# Patient Record
Sex: FEMALE | URBAN - METROPOLITAN AREA
[De-identification: names, ages, dates, MRNs, and addresses within clinical notes are randomized per-mention and may not be internally consistent; named-entity substitution may affect disease eponyms.]

---

## 2023-02-28 ENCOUNTER — OFFICE VISIT (OUTPATIENT)
Dept: URBAN - METROPOLITAN AREA CLINIC 40 | Facility: CLINIC | Age: 73
End: 2023-02-28
Payer: MEDICARE

## 2023-02-28 ENCOUNTER — LAB OUTSIDE AN ENCOUNTER (OUTPATIENT)
Dept: URBAN - METROPOLITAN AREA CLINIC 40 | Facility: CLINIC | Age: 73
End: 2023-02-28

## 2023-02-28 ENCOUNTER — WEB ENCOUNTER (OUTPATIENT)
Dept: URBAN - METROPOLITAN AREA CLINIC 40 | Facility: CLINIC | Age: 73
End: 2023-02-28

## 2023-02-28 VITALS
DIASTOLIC BLOOD PRESSURE: 80 MMHG | BODY MASS INDEX: 36.11 KG/M2 | WEIGHT: 203.8 LBS | TEMPERATURE: 97.2 F | HEIGHT: 63 IN | SYSTOLIC BLOOD PRESSURE: 130 MMHG

## 2023-02-28 DIAGNOSIS — R19.5 POSITIVE COLORECTAL CANCER SCREENING USING COLOGUARD TEST: ICD-10-CM

## 2023-02-28 DIAGNOSIS — Z85.3 HISTORY OF BREAST CANCER: ICD-10-CM

## 2023-02-28 DIAGNOSIS — R93.5 ABNORMAL COMPUTED TOMOGRAPHY ANGIOGRAPHY (CTA) OF ABDOMEN: ICD-10-CM

## 2023-02-28 DIAGNOSIS — K92.1 HEMATOCHEZIA: ICD-10-CM

## 2023-02-28 DIAGNOSIS — R11.2 NAUSEA AND VOMITING, UNSPECIFIED VOMITING TYPE: ICD-10-CM

## 2023-02-28 DIAGNOSIS — K52.9 COLITIS: ICD-10-CM

## 2023-02-28 PROBLEM — 429087003: Status: ACTIVE | Noted: 2023-02-28

## 2023-02-28 PROCEDURE — 99204 OFFICE O/P NEW MOD 45 MIN: CPT | Performed by: INTERNAL MEDICINE

## 2023-02-28 RX ORDER — METRONIDAZOLE 500 MG/1
1 TABLET TABLET ORAL THREE TIMES A DAY
Status: ACTIVE | COMMUNITY

## 2023-02-28 RX ORDER — METOPROLOL TARTRATE 50 MG/1
1 TABLET WITH FOOD TABLET, FILM COATED ORAL TWICE A DAY
Status: ACTIVE | COMMUNITY

## 2023-02-28 RX ORDER — PRAVASTATIN SODIUM 20 MG/1
1 TABLET TABLET ORAL ONCE A DAY
Status: ACTIVE | COMMUNITY

## 2023-02-28 RX ORDER — LISINOPRIL AND HYDROCHLOROTHIAZIDE 10; 12.5 MG/1; MG/1
1 TABLET TABLET ORAL ONCE A DAY
Status: ACTIVE | COMMUNITY

## 2023-02-28 RX ORDER — LEVOTHYROXINE SODIUM 125 UG/1
1 TABLET IN THE MORNING ON AN EMPTY STOMACH TABLET ORAL ONCE A DAY
Status: ACTIVE | COMMUNITY

## 2023-02-28 NOTE — HPI-TODAY'S VISIT:
Patient was evaluated in the emergency room on 2/22/2023 for abdominal pain nausea vomiting and hematochezia.  Her CBC and CMP were essentially normal her CT angiogram of the abdomen and pelvis did show mucosal edema and abnormal wall thickening involving the distal descending colon and sigmoid colon to the rectum consistent with acute colitis, either infectious ischemic or inflammatory there was also a small hiatal hernia with distal esophageal wall thickening.  On 1/31/2023 she did have a Cologuard test which was positive. Patient presents for evaluation on 2/28/2023.  She states that she underwent her last colonoscopy about 12 years ago, and she was told that it was normal except for a somewhat redundant colon.  She has had a history of chronic constipation, and has been on MiraLAX therapy.  She has no reflux or swallowing issues she was doing well until about 1 week ago when about 2 hours after eating a chicken salad sandwich she experienced acute nausea vomiting and diarrhea.  Shortly thereafter she experienced rectal bleeding, and was evaluated in the emergency room .  Her CBC and CMP were essentially normal, and the CT angiogram of the abdomen pelvis did show mucosal edema and abnormal wall thickening involving the distal descending colon sigmoid colon to the rectum, consistent with acute colitis, either infectious ischemic or inflammatory.  There was also a small hiatal hernia with distal esophageal wall thickening.  Of note on 1/31/2023 she did have a positive Cologuard test.  She is clinically much improved at present, and completing her course of metronidazole therapy.  She only took 2 days of Cipro, because of her history of neuropathy.  She does have a past history of breast cancer.

## 2023-03-03 ENCOUNTER — TELEPHONE ENCOUNTER (OUTPATIENT)
Dept: URBAN - METROPOLITAN AREA CLINIC 40 | Facility: CLINIC | Age: 73
End: 2023-03-03

## 2023-03-23 ENCOUNTER — WEB ENCOUNTER (OUTPATIENT)
Dept: URBAN - METROPOLITAN AREA SURGERY CENTER 30 | Facility: SURGERY CENTER | Age: 73
End: 2023-03-23

## 2023-03-27 ENCOUNTER — CLAIMS CREATED FROM THE CLAIM WINDOW (OUTPATIENT)
Dept: URBAN - METROPOLITAN AREA SURGERY CENTER 30 | Facility: SURGERY CENTER | Age: 73
End: 2023-03-27
Payer: MEDICARE

## 2023-03-27 ENCOUNTER — CLAIMS CREATED FROM THE CLAIM WINDOW (OUTPATIENT)
Dept: URBAN - METROPOLITAN AREA SURGERY CENTER 30 | Facility: SURGERY CENTER | Age: 73
End: 2023-03-27

## 2023-03-27 ENCOUNTER — CLAIMS CREATED FROM THE CLAIM WINDOW (OUTPATIENT)
Dept: URBAN - METROPOLITAN AREA CLINIC 4 | Facility: CLINIC | Age: 73
End: 2023-03-27
Payer: MEDICARE

## 2023-03-27 DIAGNOSIS — K29.80 ACUTE DUODENITIS: ICD-10-CM

## 2023-03-27 DIAGNOSIS — K31.89 ACQUIRED DEFORMITY OF DUODENUM: ICD-10-CM

## 2023-03-27 DIAGNOSIS — R93.5 ABNORMAL COMPUTED TOMOGRAPHY ANGIOGRAPHY (CTA) OF ABDOMEN: ICD-10-CM

## 2023-03-27 DIAGNOSIS — K21.9 ACID REFLUX: ICD-10-CM

## 2023-03-27 DIAGNOSIS — K52.9 COLITIS: ICD-10-CM

## 2023-03-27 DIAGNOSIS — R93.3 ABN FINDINGS-GI TRACT: ICD-10-CM

## 2023-03-27 DIAGNOSIS — Z85.3 HISTORY OF BREAST CANCER: ICD-10-CM

## 2023-03-27 DIAGNOSIS — R19.5 POSITIVE COLORECTAL CANCER SCREENING USING COLOGUARD TEST: ICD-10-CM

## 2023-03-27 DIAGNOSIS — D12.3 ADENOMA OF TRANSVERSE COLON: ICD-10-CM

## 2023-03-27 DIAGNOSIS — K29.81 DUODENITIS WITH BLEEDING: ICD-10-CM

## 2023-03-27 DIAGNOSIS — R19.5 ABNORMAL CONSISTENCY OF STOOL: ICD-10-CM

## 2023-03-27 DIAGNOSIS — K92.1 HEMATOCHEZIA: ICD-10-CM

## 2023-03-27 DIAGNOSIS — R11.2 NAUSEA AND VOMITING, UNSPECIFIED VOMITING TYPE: ICD-10-CM

## 2023-03-27 DIAGNOSIS — K29.70 GASTRITIS, UNSPECIFIED, WITHOUT BLEEDING: ICD-10-CM

## 2023-03-27 PROCEDURE — 45385 COLONOSCOPY W/LESION REMOVAL: CPT | Performed by: INTERNAL MEDICINE

## 2023-03-27 PROCEDURE — 88305 TISSUE EXAM BY PATHOLOGIST: CPT | Performed by: PATHOLOGY

## 2023-03-27 PROCEDURE — 43239 EGD BIOPSY SINGLE/MULTIPLE: CPT | Performed by: INTERNAL MEDICINE

## 2023-03-27 PROCEDURE — G8907 PT DOC NO EVENTS ON DISCHARG: HCPCS | Performed by: INTERNAL MEDICINE

## 2023-03-27 PROCEDURE — 88312 SPECIAL STAINS GROUP 1: CPT | Performed by: PATHOLOGY

## 2023-03-27 RX ORDER — LEVOTHYROXINE SODIUM 125 UG/1
1 TABLET IN THE MORNING ON AN EMPTY STOMACH TABLET ORAL ONCE A DAY
Status: ACTIVE | COMMUNITY

## 2023-03-27 RX ORDER — LISINOPRIL AND HYDROCHLOROTHIAZIDE 10; 12.5 MG/1; MG/1
1 TABLET TABLET ORAL ONCE A DAY
Status: ACTIVE | COMMUNITY

## 2023-03-27 RX ORDER — METRONIDAZOLE 500 MG/1
1 TABLET TABLET ORAL THREE TIMES A DAY
Status: ACTIVE | COMMUNITY

## 2023-03-27 RX ORDER — METOPROLOL TARTRATE 50 MG/1
1 TABLET WITH FOOD TABLET, FILM COATED ORAL TWICE A DAY
Status: ACTIVE | COMMUNITY

## 2023-03-27 RX ORDER — PRAVASTATIN SODIUM 20 MG/1
1 TABLET TABLET ORAL ONCE A DAY
Status: ACTIVE | COMMUNITY

## 2023-04-25 ENCOUNTER — OFFICE VISIT (OUTPATIENT)
Dept: URBAN - METROPOLITAN AREA CLINIC 40 | Facility: CLINIC | Age: 73
End: 2023-04-25
Payer: MEDICARE

## 2023-04-25 VITALS
HEART RATE: 97 BPM | DIASTOLIC BLOOD PRESSURE: 84 MMHG | HEIGHT: 63 IN | WEIGHT: 197.2 LBS | SYSTOLIC BLOOD PRESSURE: 126 MMHG | BODY MASS INDEX: 34.94 KG/M2 | TEMPERATURE: 96.6 F

## 2023-04-25 DIAGNOSIS — A04.72 C. DIFFICILE COLITIS: ICD-10-CM

## 2023-04-25 DIAGNOSIS — D12.6 TUBULAR ADENOMA OF COLON: ICD-10-CM

## 2023-04-25 DIAGNOSIS — K92.1 HEMATOCHEZIA: ICD-10-CM

## 2023-04-25 DIAGNOSIS — R11.2 NAUSEA AND VOMITING, UNSPECIFIED VOMITING TYPE: ICD-10-CM

## 2023-04-25 DIAGNOSIS — R19.5 POSITIVE COLORECTAL CANCER SCREENING USING COLOGUARD TEST: ICD-10-CM

## 2023-04-25 PROCEDURE — 99214 OFFICE O/P EST MOD 30 MIN: CPT | Performed by: INTERNAL MEDICINE

## 2023-04-25 RX ORDER — LISINOPRIL AND HYDROCHLOROTHIAZIDE 10; 12.5 MG/1; MG/1
1 TABLET TABLET ORAL ONCE A DAY
Status: ACTIVE | COMMUNITY

## 2023-04-25 RX ORDER — LEVOTHYROXINE SODIUM 125 UG/1
1 TABLET IN THE MORNING ON AN EMPTY STOMACH TABLET ORAL ONCE A DAY
Status: ACTIVE | COMMUNITY

## 2023-04-25 RX ORDER — METRONIDAZOLE 500 MG/1
1 TABLET TABLET ORAL THREE TIMES A DAY
Status: ON HOLD | COMMUNITY

## 2023-04-25 RX ORDER — PRAVASTATIN SODIUM 20 MG/1
1 TABLET TABLET ORAL ONCE A DAY
Status: ACTIVE | COMMUNITY

## 2023-04-25 RX ORDER — METOPROLOL TARTRATE 50 MG/1
1 TABLET WITH FOOD TABLET, FILM COATED ORAL TWICE A DAY
Status: ACTIVE | COMMUNITY

## 2023-04-25 NOTE — HPI-TODAY'S VISIT:
Patient was evaluated in the emergency room on 2/22/2023 for abdominal pain nausea vomiting and hematochezia.  Her CBC and CMP were essentially normal her CT angiogram of the abdomen and pelvis did show mucosal edema and abnormal wall thickening involving the distal descending colon and sigmoid colon to the rectum consistent with acute colitis, either infectious ischemic or inflammatory there was also a small hiatal hernia with distal esophageal wall thickening.  On 1/31/2023 she did have a Cologuard test which was positive. Patient presents for evaluation on 2/28/2023.  She states that she underwent her last colonoscopy about 12 years ago, and she was told that it was normal except for a somewhat redundant colon.  She has had a history of chronic constipation, and has been on MiraLAX therapy.  She has no reflux or swallowing issues she was doing well until about 1 week ago when about 2 hours after eating a chicken salad sandwich she experienced acute nausea vomiting and diarrhea.  Shortly thereafter she experienced rectal bleeding, and was evaluated in the emergency room .  Her CBC and CMP were essentially normal, and the CT angiogram of the abdomen pelvis did show mucosal edema and abnormal wall thickening involving the distal descending colon sigmoid colon to the rectum, consistent with acute colitis, either infectious ischemic or inflammatory.  There was also a small hiatal hernia with distal esophageal wall thickening.  Of note on 1/31/2023 she did have a positive Cologuard test.  She is clinically much improved at present, and completing her course of metronidazole therapy.  She only took 2 days of Cipro, because of her history of neuropathy.  She does have a past history of breast cancer. Patient underwent EGD and colonoscopy on 3/27/2023 the endoscopy showed esophagitis with mild gastritis and duodenitis biopsies were taken biopsies from the esophagus stomach and duodenum revealed benign inflammation.  The colonoscopy revealed a 3 mm polyp that was removed from the transverse colon with pathology showing tubular adenoma. Patient was admitted to [facility] on 3/29/2023 after presenting with nausea vomiting diarrhea she was hypokalemic WBCs was 18.2 she underwent abdominal pelvic CT scan which showed diffuse colonic wall thickening suspicious for colitis stool studies did reveal a positive C. difficile infection.  Patient responded to treatment, and was given a 2-week course of p.o. vancomycin, to be completed as an outpatient. Patient returns for follow-up on 4/25/2023.  She did complete 2 weeks of vancomycin therapy 125 mg 4 times daily as prescribed at discharge from the hospital.  She has been taking daily probiotics.  She is much improved from a GI standpoint.  She is having no further abdominal pain, and she is averaging 2-3 soft stools per day, with no bleeding.  I reviewed in detail the results of her EGD and colonoscopy done 3/27/2023 the upper endoscopy showed mild esophagitis gastritis and duodenitis, with biopsy showing benign inflammation.  The colonoscopy revealed a normal-appearing mucosa, and a small tubular adenoma was removed from the transverse colon.  She was subsequently admitted to [facility] with nausea vomiting diarrhea, she was treated for hypokalemia, abdominal pelvic CT scan showed diffuse colitis and stool studies did show a positive C. difficile infection.  At this point she is clinically much improved.

## 2023-07-12 ENCOUNTER — DASHBOARD ENCOUNTERS (OUTPATIENT)
Age: 73
End: 2023-07-12

## 2023-07-25 ENCOUNTER — OFFICE VISIT (OUTPATIENT)
Dept: URBAN - METROPOLITAN AREA CLINIC 40 | Facility: CLINIC | Age: 73
End: 2023-07-25

## 2023-07-25 RX ORDER — LEVOTHYROXINE SODIUM 125 UG/1
1 TABLET IN THE MORNING ON AN EMPTY STOMACH TABLET ORAL ONCE A DAY
Status: ACTIVE | COMMUNITY

## 2023-07-25 RX ORDER — METOPROLOL TARTRATE 50 MG/1
1 TABLET WITH FOOD TABLET, FILM COATED ORAL TWICE A DAY
Status: ACTIVE | COMMUNITY

## 2023-07-25 RX ORDER — METRONIDAZOLE 500 MG/1
1 TABLET TABLET ORAL THREE TIMES A DAY
COMMUNITY

## 2023-07-25 RX ORDER — PRAVASTATIN SODIUM 20 MG/1
1 TABLET TABLET ORAL ONCE A DAY
Status: ACTIVE | COMMUNITY

## 2023-07-25 RX ORDER — LISINOPRIL AND HYDROCHLOROTHIAZIDE 10; 12.5 MG/1; MG/1
1 TABLET TABLET ORAL ONCE A DAY
Status: ACTIVE | COMMUNITY

## 2024-08-03 PROBLEM — 428283002: Status: ACTIVE | Noted: 2024-08-03

## 2024-08-03 PROBLEM — 14760008: Status: ACTIVE | Noted: 2024-08-03

## 2024-08-06 ENCOUNTER — OFFICE VISIT (OUTPATIENT)
Dept: URBAN - METROPOLITAN AREA CLINIC 40 | Facility: CLINIC | Age: 74
End: 2024-08-06
Payer: MEDICARE

## 2024-08-06 VITALS
HEIGHT: 63 IN | HEART RATE: 63 BPM | SYSTOLIC BLOOD PRESSURE: 162 MMHG | DIASTOLIC BLOOD PRESSURE: 92 MMHG | WEIGHT: 192.4 LBS | BODY MASS INDEX: 34.09 KG/M2 | TEMPERATURE: 97.7 F

## 2024-08-06 DIAGNOSIS — K52.89 (LYMPHOCYTIC) MICROSCOPIC COLITIS: ICD-10-CM

## 2024-08-06 DIAGNOSIS — K59.09 CHANGE IN BOWEL MOVEMENTS INTERMITTENT CONSTIPATION. URGENCY IN THE MORNING.: ICD-10-CM

## 2024-08-06 PROCEDURE — 99214 OFFICE O/P EST MOD 30 MIN: CPT | Performed by: INTERNAL MEDICINE

## 2024-08-06 RX ORDER — METRONIDAZOLE 500 MG/1
1 TABLET TABLET ORAL THREE TIMES A DAY
COMMUNITY

## 2024-08-06 RX ORDER — LEVOTHYROXINE SODIUM 125 UG/1
1 TABLET IN THE MORNING ON AN EMPTY STOMACH TABLET ORAL ONCE A DAY
Status: ACTIVE | COMMUNITY

## 2024-08-06 RX ORDER — LINACLOTIDE 145 UG/1
1 CAPSULE AT LEAST 30 MINUTES BEFORE THE FIRST MEAL OF THE DAY ON AN EMPTY STOMACH CAPSULE, GELATIN COATED ORAL ONCE A DAY
Qty: 90 | Refills: 3 | OUTPATIENT
Start: 2024-08-06 | End: 2025-08-01

## 2024-08-06 RX ORDER — METOPROLOL TARTRATE 50 MG/1
1 TABLET WITH FOOD TABLET, FILM COATED ORAL TWICE A DAY
Status: ACTIVE | COMMUNITY

## 2024-08-06 RX ORDER — PRAVASTATIN SODIUM 20 MG/1
1 TABLET TABLET ORAL ONCE A DAY
Status: ACTIVE | COMMUNITY

## 2024-08-06 RX ORDER — LISINOPRIL AND HYDROCHLOROTHIAZIDE 10; 12.5 MG/1; MG/1
1 TABLET TABLET ORAL ONCE A DAY
Status: ACTIVE | COMMUNITY

## 2024-08-06 NOTE — HPI-TODAY'S VISIT:
Patient was evaluated in the emergency room on 2/22/2023 for abdominal pain nausea vomiting and hematochezia.  Her CBC and CMP were essentially normal her CT angiogram of the abdomen and pelvis did show mucosal edema and abnormal wall thickening involving the distal descending colon and sigmoid colon to the rectum consistent with acute colitis, either infectious ischemic or inflammatory there was also a small hiatal hernia with distal esophageal wall thickening.  On 1/31/2023 she did have a Cologuard test which was positive. Patient presents for evaluation on 2/28/2023.  She states that she underwent her last colonoscopy about 12 years ago, and she was told that it was normal except for a somewhat redundant colon.  She has had a history of chronic constipation, and has been on MiraLAX therapy.  She has no reflux or swallowing issues she was doing well until about 1 week ago when about 2 hours after eating a chicken salad sandwich she experienced acute nausea vomiting and diarrhea.  Shortly thereafter she experienced rectal bleeding, and was evaluated in the emergency room .  Her CBC and CMP were essentially normal, and the CT angiogram of the abdomen pelvis did show mucosal edema and abnormal wall thickening involving the distal descending colon sigmoid colon to the rectum, consistent with acute colitis, either infectious ischemic or inflammatory.  There was also a small hiatal hernia with distal esophageal wall thickening.  Of note on 1/31/2023 she did have a positive Cologuard test.  She is clinically much improved at present, and completing her course of metronidazole therapy.  She only took 2 days of Cipro, because of her history of neuropathy.  She does have a past history of breast cancer. Patient underwent EGD and colonoscopy on 3/27/2023 the endoscopy showed esophagitis with mild gastritis and duodenitis biopsies were taken biopsies from the esophagus stomach and duodenum revealed benign inflammation.  The colonoscopy revealed a 3 mm polyp that was removed from the transverse colon with pathology showing tubular adenoma. Patient was admitted to [facility] on 3/29/2023 after presenting with nausea vomiting diarrhea she was hypokalemic WBCs was 18.2 she underwent abdominal pelvic CT scan which showed diffuse colonic wall thickening suspicious for colitis stool studies did reveal a positive C. difficile infection.  Patient responded to treatment, and was given a 2-week course of p.o. vancomycin, to be completed as an outpatient. Patient returns for follow-up on 4/25/2023.  She did complete 2 weeks of vancomycin therapy 125 mg 4 times daily as prescribed at discharge from the hospital.  She has been taking daily probiotics.  She is much improved from a GI standpoint.  She is having no further abdominal pain, and she is averaging 2-3 soft stools per day, with no bleeding.  I reviewed in detail the results of her EGD and colonoscopy done 3/27/2023 the upper endoscopy showed mild esophagitis gastritis and duodenitis, with biopsy showing benign inflammation.  The colonoscopy revealed a normal-appearing mucosa, and a small tubular adenoma was removed from the transverse colon.  She was subsequently admitted to [facility] with nausea vomiting diarrhea, she was treated for hypokalemia, abdominal pelvic CT scan showed diffuse colitis and stool studies did show a positive C. difficile infection.  At this point she is clinically much improved. Patient was evaluated in the emergency room on 8/1/2024 for abdominal pain and constipation, not having a significant bowel movement for almost 2 weeks.  She underwent noncontrast abdominal pelvic CT scan which was essentially unremarkable and had essentially normal CBC and CMP. Patient returns for follow-up on 8/6/2024.  Her main problem at present is constipation.  She was evaluated the emergency room 8/1/2024 for abdominal pain and constipation, not having a bowel movement for almost 2 weeks.  She underwent noncontrast abdominal pelvic CT scan which was essentially unremarkable, and an essentially normal CBC and CMP.  Eventually after given laxatives, she did have a bowel movement.  However since returning home on MiraLAX and Dulcolax, she has not had an effective bowel movement in 6 days.  She denies abdominal pain, and has been no overt GI bleeding.  She is having no significant heartburn or reflux, no swallowing issues.  She did undergo EGD and colonoscopy in March 2023.  The upper endoscopy showed esophagitis gastritis and duodenitis, with biopsy showing benign inflammation colonoscopy revealed a 3 mm tubular adenoma that was removed from the transverse colon.

## 2024-08-29 ENCOUNTER — OFFICE VISIT (OUTPATIENT)
Dept: URBAN - METROPOLITAN AREA CLINIC 40 | Facility: CLINIC | Age: 74
End: 2024-08-29

## 2024-09-12 ENCOUNTER — OFFICE VISIT (OUTPATIENT)
Dept: URBAN - METROPOLITAN AREA CLINIC 40 | Facility: CLINIC | Age: 74
End: 2024-09-12

## 2024-10-08 ENCOUNTER — OFFICE VISIT (OUTPATIENT)
Dept: URBAN - METROPOLITAN AREA CLINIC 40 | Facility: CLINIC | Age: 74
End: 2024-10-08
Payer: MEDICARE

## 2024-10-08 VITALS
SYSTOLIC BLOOD PRESSURE: 138 MMHG | DIASTOLIC BLOOD PRESSURE: 76 MMHG | HEIGHT: 63 IN | TEMPERATURE: 97.2 F | BODY MASS INDEX: 33.88 KG/M2 | HEART RATE: 53 BPM | WEIGHT: 191.2 LBS

## 2024-10-08 DIAGNOSIS — Z86.0101 HISTORY OF ADENOMATOUS POLYP OF COLON: ICD-10-CM

## 2024-10-08 DIAGNOSIS — K59.00 CONSTIPATION, UNSPECIFIED CONSTIPATION TYPE: ICD-10-CM

## 2024-10-08 PROCEDURE — 99214 OFFICE O/P EST MOD 30 MIN: CPT | Performed by: INTERNAL MEDICINE

## 2024-10-08 RX ORDER — METRONIDAZOLE 500 MG/1
1 TABLET TABLET ORAL THREE TIMES A DAY
COMMUNITY

## 2024-10-08 RX ORDER — LEVOTHYROXINE SODIUM 125 UG/1
1 TABLET IN THE MORNING ON AN EMPTY STOMACH TABLET ORAL ONCE A DAY
Status: ACTIVE | COMMUNITY

## 2024-10-08 RX ORDER — LISINOPRIL AND HYDROCHLOROTHIAZIDE 10; 12.5 MG/1; MG/1
1 TABLET TABLET ORAL ONCE A DAY
Status: ACTIVE | COMMUNITY

## 2024-10-08 RX ORDER — METOPROLOL TARTRATE 50 MG/1
1 TABLET WITH FOOD TABLET, FILM COATED ORAL TWICE A DAY
Status: ON HOLD | COMMUNITY

## 2024-10-08 RX ORDER — LINACLOTIDE 145 UG/1
1 CAPSULE AT LEAST 30 MINUTES BEFORE THE FIRST MEAL OF THE DAY ON AN EMPTY STOMACH CAPSULE, GELATIN COATED ORAL ONCE A DAY
Qty: 90 | Refills: 3 | OUTPATIENT

## 2024-10-08 RX ORDER — LINACLOTIDE 145 UG/1
1 CAPSULE AT LEAST 30 MINUTES BEFORE THE FIRST MEAL OF THE DAY ON AN EMPTY STOMACH CAPSULE, GELATIN COATED ORAL ONCE A DAY
Qty: 90 | Refills: 3 | Status: ON HOLD | COMMUNITY
Start: 2024-08-06 | End: 2025-08-01

## 2024-10-08 RX ORDER — PRAVASTATIN SODIUM 20 MG/1
1 TABLET TABLET ORAL ONCE A DAY
Status: ACTIVE | COMMUNITY

## 2024-10-08 NOTE — HPI-TODAY'S VISIT:
Patient was evaluated in the emergency room on 2/22/2023 for abdominal pain nausea vomiting and hematochezia.  Her CBC and CMP were essentially normal her CT angiogram of the abdomen and pelvis did show mucosal edema and abnormal wall thickening involving the distal descending colon and sigmoid colon to the rectum consistent with acute colitis, either infectious ischemic or inflammatory there was also a small hiatal hernia with distal esophageal wall thickening.  On 1/31/2023 she did have a Cologuard test which was positive. Patient presents for evaluation on 2/28/2023.  She states that she underwent her last colonoscopy about 12 years ago, and she was told that it was normal except for a somewhat redundant colon.  She has had a history of chronic constipation, and has been on MiraLAX therapy.  She has no reflux or swallowing issues she was doing well until about 1 week ago when about 2 hours after eating a chicken salad sandwich she experienced acute nausea vomiting and diarrhea.  Shortly thereafter she experienced rectal bleeding, and was evaluated in the emergency room .  Her CBC and CMP were essentially normal, and the CT angiogram of the abdomen pelvis did show mucosal edema and abnormal wall thickening involving the distal descending colon sigmoid colon to the rectum, consistent with acute colitis, either infectious ischemic or inflammatory.  There was also a small hiatal hernia with distal esophageal wall thickening.  Of note on 1/31/2023 she did have a positive Cologuard test.  She is clinically much improved at present, and completing her course of metronidazole therapy.  She only took 2 days of Cipro, because of her history of neuropathy.  She does have a past history of breast cancer. Patient underwent EGD and colonoscopy on 3/27/2023 the endoscopy showed esophagitis with mild gastritis and duodenitis biopsies were taken biopsies from the esophagus stomach and duodenum revealed benign inflammation.  The colonoscopy revealed a 3 mm polyp that was removed from the transverse colon with pathology showing tubular adenoma. Patient was admitted to [facility] on 3/29/2023 after presenting with nausea vomiting diarrhea she was hypokalemic WBCs was 18.2 she underwent abdominal pelvic CT scan which showed diffuse colonic wall thickening suspicious for colitis stool studies did reveal a positive C. difficile infection.  Patient responded to treatment, and was given a 2-week course of p.o. vancomycin, to be completed as an outpatient. Patient returns for follow-up on 4/25/2023.  She did complete 2 weeks of vancomycin therapy 125 mg 4 times daily as prescribed at discharge from the hospital.  She has been taking daily probiotics.  She is much improved from a GI standpoint.  She is having no further abdominal pain, and she is averaging 2-3 soft stools per day, with no bleeding.  I reviewed in detail the results of her EGD and colonoscopy done 3/27/2023 the upper endoscopy showed mild esophagitis gastritis and duodenitis, with biopsy showing benign inflammation.  The colonoscopy revealed a normal-appearing mucosa, and a small tubular adenoma was removed from the transverse colon.  She was subsequently admitted to [facility] with nausea vomiting diarrhea, she was treated for hypokalemia, abdominal pelvic CT scan showed diffuse colitis and stool studies did show a positive C. difficile infection.  At this point she is clinically much improved. Patient was evaluated in the emergency room on 8/1/2024 for abdominal pain and constipation, not having a significant bowel movement for almost 2 weeks.  She underwent noncontrast abdominal pelvic CT scan which was essentially unremarkable and had essentially normal CBC and CMP. Patient returns for follow-up on 8/6/2024.  Her main problem at present is constipation.  She was evaluated the emergency room 8/1/2024 for abdominal pain and constipation, not having a bowel movement for almost 2 weeks.  She underwent noncontrast abdominal pelvic CT scan which was essentially unremarkable, and an essentially normal CBC and CMP.  Eventually after given laxatives, she did have a bowel movement.  However since returning home on MiraLAX and Dulcolax, she has not had an effective bowel movement in 6 days.  She denies abdominal pain, and has been no overt GI bleeding.  She is having no significant heartburn or reflux, no swallowing issues.  She did undergo EGD and colonoscopy in March 2023.  The upper endoscopy showed esophagitis gastritis and duodenitis, with biopsy showing benign inflammation colonoscopy revealed a 3 mm tubular adenoma that was removed from the transverse colon. Patient returns for follow-up on 10/8/2024.  She states that she is still experiencing significant constipation, sometimes going as long as 7 days without a bowel movement.  She has been taking Linzess on a daily basis but only 145 mcg this was not effective so she was combining Colace MiraLAX and Dulcolax, without success after a week she did take magnesium citrate and combined with fleets enema which was effective.  I again reviewed her previous ER evaluation for abdominal pain and constipation where she had an abdominal pelvic CT scan essentially unremarkable normal CBC and CMP.  She underwent colonoscopy in March 2023 where a 3 mm tubular adenoma was removed, the exam was otherwise unremarkable.

## 2024-11-12 ENCOUNTER — OFFICE VISIT (OUTPATIENT)
Dept: URBAN - METROPOLITAN AREA CLINIC 40 | Facility: CLINIC | Age: 74
End: 2024-11-12
Payer: MEDICARE

## 2024-11-12 VITALS
TEMPERATURE: 97.7 F | WEIGHT: 198 LBS | DIASTOLIC BLOOD PRESSURE: 90 MMHG | SYSTOLIC BLOOD PRESSURE: 152 MMHG | HEIGHT: 63 IN | BODY MASS INDEX: 35.08 KG/M2 | HEART RATE: 61 BPM

## 2024-11-12 DIAGNOSIS — Z86.0101 HISTORY OF ADENOMATOUS POLYP OF COLON: ICD-10-CM

## 2024-11-12 DIAGNOSIS — K59.00 CONSTIPATION, UNSPECIFIED CONSTIPATION TYPE: ICD-10-CM

## 2024-11-12 PROCEDURE — 99213 OFFICE O/P EST LOW 20 MIN: CPT | Performed by: INTERNAL MEDICINE

## 2024-11-12 RX ORDER — METRONIDAZOLE 500 MG/1
1 TABLET TABLET ORAL THREE TIMES A DAY
COMMUNITY

## 2024-11-12 RX ORDER — METOPROLOL TARTRATE 50 MG/1
1 TABLET WITH FOOD TABLET, FILM COATED ORAL TWICE A DAY
Status: ON HOLD | COMMUNITY

## 2024-11-12 RX ORDER — LEVOTHYROXINE SODIUM 125 UG/1
1 TABLET IN THE MORNING ON AN EMPTY STOMACH TABLET ORAL ONCE A DAY
Status: ACTIVE | COMMUNITY

## 2024-11-12 RX ORDER — LISINOPRIL AND HYDROCHLOROTHIAZIDE 10; 12.5 MG/1; MG/1
1 TABLET TABLET ORAL ONCE A DAY
Status: ACTIVE | COMMUNITY

## 2024-11-12 RX ORDER — LINACLOTIDE 145 UG/1
1 CAPSULE AT LEAST 30 MINUTES BEFORE THE FIRST MEAL OF THE DAY ON AN EMPTY STOMACH CAPSULE, GELATIN COATED ORAL ONCE A DAY
Qty: 90 | Refills: 3 | OUTPATIENT

## 2024-11-12 RX ORDER — PRAVASTATIN SODIUM 20 MG/1
1 TABLET TABLET ORAL ONCE A DAY
Status: ACTIVE | COMMUNITY

## 2024-11-12 RX ORDER — LINACLOTIDE 145 UG/1
1 CAPSULE AT LEAST 30 MINUTES BEFORE THE FIRST MEAL OF THE DAY ON AN EMPTY STOMACH CAPSULE, GELATIN COATED ORAL ONCE A DAY
Qty: 90 | Refills: 3 | Status: ACTIVE | COMMUNITY

## 2024-11-12 NOTE — HPI-TODAY'S VISIT:
Patient was evaluated in the emergency room on 2/22/2023 for abdominal pain nausea vomiting and hematochezia.  Her CBC and CMP were essentially normal her CT angiogram of the abdomen and pelvis did show mucosal edema and abnormal wall thickening involving the distal descending colon and sigmoid colon to the rectum consistent with acute colitis, either infectious ischemic or inflammatory there was also a small hiatal hernia with distal esophageal wall thickening.  On 1/31/2023 she did have a Cologuard test which was positive. Patient presents for evaluation on 2/28/2023.  She states that she underwent her last colonoscopy about 12 years ago, and she was told that it was normal except for a somewhat redundant colon.  She has had a history of chronic constipation, and has been on MiraLAX therapy.  She has no reflux or swallowing issues she was doing well until about 1 week ago when about 2 hours after eating a chicken salad sandwich she experienced acute nausea vomiting and diarrhea.  Shortly thereafter she experienced rectal bleeding, and was evaluated in the emergency room .  Her CBC and CMP were essentially normal, and the CT angiogram of the abdomen pelvis did show mucosal edema and abnormal wall thickening involving the distal descending colon sigmoid colon to the rectum, consistent with acute colitis, either infectious ischemic or inflammatory.  There was also a small hiatal hernia with distal esophageal wall thickening.  Of note on 1/31/2023 she did have a positive Cologuard test.  She is clinically much improved at present, and completing her course of metronidazole therapy.  She only took 2 days of Cipro, because of her history of neuropathy.  She does have a past history of breast cancer. Patient underwent EGD and colonoscopy on 3/27/2023 the endoscopy showed esophagitis with mild gastritis and duodenitis biopsies were taken biopsies from the esophagus stomach and duodenum revealed benign inflammation.  The colonoscopy revealed a 3 mm polyp that was removed from the transverse colon with pathology showing tubular adenoma. Patient was admitted to [facility] on 3/29/2023 after presenting with nausea vomiting diarrhea she was hypokalemic WBCs was 18.2 she underwent abdominal pelvic CT scan which showed diffuse colonic wall thickening suspicious for colitis stool studies did reveal a positive C. difficile infection.  Patient responded to treatment, and was given a 2-week course of p.o. vancomycin, to be completed as an outpatient. Patient returns for follow-up on 4/25/2023.  She did complete 2 weeks of vancomycin therapy 125 mg 4 times daily as prescribed at discharge from the hospital.  She has been taking daily probiotics.  She is much improved from a GI standpoint.  She is having no further abdominal pain, and she is averaging 2-3 soft stools per day, with no bleeding.  I reviewed in detail the results of her EGD and colonoscopy done 3/27/2023 the upper endoscopy showed mild esophagitis gastritis and duodenitis, with biopsy showing benign inflammation.  The colonoscopy revealed a normal-appearing mucosa, and a small tubular adenoma was removed from the transverse colon.  She was subsequently admitted to [facility] with nausea vomiting diarrhea, she was treated for hypokalemia, abdominal pelvic CT scan showed diffuse colitis and stool studies did show a positive C. difficile infection.  At this point she is clinically much improved. Patient was evaluated in the emergency room on 8/1/2024 for abdominal pain and constipation, not having a significant bowel movement for almost 2 weeks.  She underwent noncontrast abdominal pelvic CT scan which was essentially unremarkable and had essentially normal CBC and CMP. Patient returns for follow-up on 8/6/2024.  Her main problem at present is constipation.  She was evaluated the emergency room 8/1/2024 for abdominal pain and constipation, not having a bowel movement for almost 2 weeks.  She underwent noncontrast abdominal pelvic CT scan which was essentially unremarkable, and an essentially normal CBC and CMP.  Eventually after given laxatives, she did have a bowel movement.  However since returning home on MiraLAX and Dulcolax, she has not had an effective bowel movement in 6 days.  She denies abdominal pain, and has been no overt GI bleeding.  She is having no significant heartburn or reflux, no swallowing issues.  She did undergo EGD and colonoscopy in March 2023.  The upper endoscopy showed esophagitis gastritis and duodenitis, with biopsy showing benign inflammation colonoscopy revealed a 3 mm tubular adenoma that was removed from the transverse colon. Patient returns for follow-up on 10/8/2024.  She states that she is still experiencing significant constipation, sometimes going as long as 7 days without a bowel movement.  She has been taking Linzess on a daily basis but only 145 mcg this was not effective so she was combining Colace MiraLAX and Dulcolax, without success after a week she did take magnesium citrate and combined with fleets enema which was effective.  I again reviewed her previous ER evaluation for abdominal pain and constipation where she had an abdominal pelvic CT scan essentially unremarkable normal CBC and CMP.  She underwent colonoscopy in March 2023 where a 3 mm tubular adenoma was removed, the exam was otherwise unremarkable. Patient returns for follow-up on 11/12/2024.  She states that she has been using samples of Linzess to follow her regimen of Linzess 290 mcg in the morning followed later by MiraLAX and a stool softener, and again later in the day before dinner she will again take MiraLAX and a stool softener.  On this regimen she is having normal bowel movements and feels great she has had no GI bleeding, no abdominal pain she eats well with a good appetite.  She has no significant heartburn or reflux, and no swallowing issues.  She could not fill the prescription for Linzess, as it was too expensive.

## 2024-12-02 ENCOUNTER — OFFICE VISIT (OUTPATIENT)
Dept: URBAN - METROPOLITAN AREA TELEHEALTH 2 | Facility: TELEHEALTH | Age: 74
End: 2024-12-02

## 2025-02-13 ENCOUNTER — OFFICE VISIT (OUTPATIENT)
Dept: URBAN - METROPOLITAN AREA CLINIC 40 | Facility: CLINIC | Age: 75
End: 2025-02-13
Payer: MEDICARE

## 2025-02-13 VITALS
WEIGHT: 203.6 LBS | HEIGHT: 63 IN | DIASTOLIC BLOOD PRESSURE: 90 MMHG | TEMPERATURE: 97.8 F | HEART RATE: 60 BPM | SYSTOLIC BLOOD PRESSURE: 152 MMHG | BODY MASS INDEX: 36.07 KG/M2

## 2025-02-13 DIAGNOSIS — K59.00 CONSTIPATION, UNSPECIFIED CONSTIPATION TYPE: ICD-10-CM

## 2025-02-13 DIAGNOSIS — Z86.0101 HISTORY OF ADENOMATOUS POLYP OF COLON: ICD-10-CM

## 2025-02-13 PROCEDURE — 99213 OFFICE O/P EST LOW 20 MIN: CPT | Performed by: INTERNAL MEDICINE

## 2025-02-13 RX ORDER — LINACLOTIDE 145 UG/1
1 CAPSULE AT LEAST 30 MINUTES BEFORE THE FIRST MEAL OF THE DAY ON AN EMPTY STOMACH CAPSULE, GELATIN COATED ORAL ONCE A DAY
Qty: 90 | Refills: 3 | OUTPATIENT

## 2025-02-13 RX ORDER — LISINOPRIL AND HYDROCHLOROTHIAZIDE 10; 12.5 MG/1; MG/1
1 TABLET TABLET ORAL ONCE A DAY
Status: ACTIVE | COMMUNITY

## 2025-02-13 RX ORDER — METRONIDAZOLE 500 MG/1
1 TABLET TABLET ORAL THREE TIMES A DAY
Status: ON HOLD | COMMUNITY

## 2025-02-13 RX ORDER — LINACLOTIDE 290 UG/1
1 CAPSULE AT LEAST 30 MINUTES BEFORE THE FIRST MEAL OF THE DAY ON AN EMPTY STOMACH CAPSULE, GELATIN COATED ORAL ONCE A DAY
Status: ACTIVE | COMMUNITY

## 2025-02-13 RX ORDER — METOPROLOL TARTRATE 50 MG/1
1 TABLET WITH FOOD TABLET, FILM COATED ORAL TWICE A DAY
Status: ON HOLD | COMMUNITY

## 2025-02-13 RX ORDER — LEVOTHYROXINE SODIUM 125 UG/1
1 TABLET IN THE MORNING ON AN EMPTY STOMACH TABLET ORAL ONCE A DAY
Status: ACTIVE | COMMUNITY

## 2025-02-13 RX ORDER — LINACLOTIDE 145 UG/1
1 CAPSULE AT LEAST 30 MINUTES BEFORE THE FIRST MEAL OF THE DAY ON AN EMPTY STOMACH CAPSULE, GELATIN COATED ORAL ONCE A DAY
Qty: 90 | Refills: 3 | Status: ON HOLD | COMMUNITY

## 2025-02-13 RX ORDER — PRAVASTATIN SODIUM 20 MG/1
1 TABLET TABLET ORAL ONCE A DAY
Status: ACTIVE | COMMUNITY

## 2025-02-13 NOTE — HPI-TODAY'S VISIT:
Patient was evaluated in the emergency room on 2/22/2023 for abdominal pain nausea vomiting and hematochezia.  Her CBC and CMP were essentially normal her CT angiogram of the abdomen and pelvis did show mucosal edema and abnormal wall thickening involving the distal descending colon and sigmoid colon to the rectum consistent with acute colitis, either infectious ischemic or inflammatory there was also a small hiatal hernia with distal esophageal wall thickening.  On 1/31/2023 she did have a Cologuard test which was positive. Patient presents for evaluation on 2/28/2023.  She states that she underwent her last colonoscopy about 12 years ago, and she was told that it was normal except for a somewhat redundant colon.  She has had a history of chronic constipation, and has been on MiraLAX therapy.  She has no reflux or swallowing issues she was doing well until about 1 week ago when about 2 hours after eating a chicken salad sandwich she experienced acute nausea vomiting and diarrhea.  Shortly thereafter she experienced rectal bleeding, and was evaluated in the emergency room .  Her CBC and CMP were essentially normal, and the CT angiogram of the abdomen pelvis did show mucosal edema and abnormal wall thickening involving the distal descending colon sigmoid colon to the rectum, consistent with acute colitis, either infectious ischemic or inflammatory.  There was also a small hiatal hernia with distal esophageal wall thickening.  Of note on 1/31/2023 she did have a positive Cologuard test.  She is clinically much improved at present, and completing her course of metronidazole therapy.  She only took 2 days of Cipro, because of her history of neuropathy.  She does have a past history of breast cancer. Patient underwent EGD and colonoscopy on 3/27/2023 the endoscopy showed esophagitis with mild gastritis and duodenitis biopsies were taken biopsies from the esophagus stomach and duodenum revealed benign inflammation.  The colonoscopy revealed a 3 mm polyp that was removed from the transverse colon with pathology showing tubular adenoma. Patient was admitted to [facility] on 3/29/2023 after presenting with nausea vomiting diarrhea she was hypokalemic WBCs was 18.2 she underwent abdominal pelvic CT scan which showed diffuse colonic wall thickening suspicious for colitis stool studies did reveal a positive C. difficile infection.  Patient responded to treatment, and was given a 2-week course of p.o. vancomycin, to be completed as an outpatient. Patient returns for follow-up on 4/25/2023.  She did complete 2 weeks of vancomycin therapy 125 mg 4 times daily as prescribed at discharge from the hospital.  She has been taking daily probiotics.  She is much improved from a GI standpoint.  She is having no further abdominal pain, and she is averaging 2-3 soft stools per day, with no bleeding.  I reviewed in detail the results of her EGD and colonoscopy done 3/27/2023 the upper endoscopy showed mild esophagitis gastritis and duodenitis, with biopsy showing benign inflammation.  The colonoscopy revealed a normal-appearing mucosa, and a small tubular adenoma was removed from the transverse colon.  She was subsequently admitted to [facility] with nausea vomiting diarrhea, she was treated for hypokalemia, abdominal pelvic CT scan showed diffuse colitis and stool studies did show a positive C. difficile infection.  At this point she is clinically much improved. Patient was evaluated in the emergency room on 8/1/2024 for abdominal pain and constipation, not having a significant bowel movement for almost 2 weeks.  She underwent noncontrast abdominal pelvic CT scan which was essentially unremarkable and had essentially normal CBC and CMP. Patient returns for follow-up on 8/6/2024.  Her main problem at present is constipation.  She was evaluated the emergency room 8/1/2024 for abdominal pain and constipation, not having a bowel movement for almost 2 weeks.  She underwent noncontrast abdominal pelvic CT scan which was essentially unremarkable, and an essentially normal CBC and CMP.  Eventually after given laxatives, she did have a bowel movement.  However since returning home on MiraLAX and Dulcolax, she has not had an effective bowel movement in 6 days.  She denies abdominal pain, and has been no overt GI bleeding.  She is having no significant heartburn or reflux, no swallowing issues.  She did undergo EGD and colonoscopy in March 2023.  The upper endoscopy showed esophagitis gastritis and duodenitis, with biopsy showing benign inflammation colonoscopy revealed a 3 mm tubular adenoma that was removed from the transverse colon. Patient returns for follow-up on 10/8/2024.  She states that she is still experiencing significant constipation, sometimes going as long as 7 days without a bowel movement.  She has been taking Linzess on a daily basis but only 145 mcg this was not effective so she was combining Colace MiraLAX and Dulcolax, without success after a week she did take magnesium citrate and combined with fleets enema which was effective.  I again reviewed her previous ER evaluation for abdominal pain and constipation where she had an abdominal pelvic CT scan essentially unremarkable normal CBC and CMP.  She underwent colonoscopy in March 2023 where a 3 mm tubular adenoma was removed, the exam was otherwise unremarkable. Patient returns for follow-up on 11/12/2024.  She states that she has been using samples of Linzess to follow her regimen of Linzess 290 mcg in the morning followed later by MiraLAX and a stool softener, and again later in the day before dinner she will again take MiraLAX and a stool softener.  On this regimen she is having normal bowel movements and feels great she has had no GI bleeding, no abdominal pain she eats well with a good appetite.  She has no significant heartburn or reflux, and no swallowing issues.  She could not fill the prescription for Linzess, as it was too expensive. Patient returns for follow-up on 2/13/2025.  Overall she is doing very well from a GI standpoint.  She is now getting her Linzess 290 mcg from patient assistance, which she has been taking on a daily basis in the afternoon she will generally take MiraLAX and a stool softener, and her bowel movements have been essentially normal.  She has had no abdominal pain no overt GI bleeding.  She has a good appetite with no unexplained weight loss.  She is having no significant heartburn or reflux, and no swallowing issues.

## 2025-08-14 ENCOUNTER — OFFICE VISIT (OUTPATIENT)
Dept: URBAN - METROPOLITAN AREA CLINIC 40 | Facility: CLINIC | Age: 75
End: 2025-08-14
Payer: MEDICARE

## 2025-08-14 DIAGNOSIS — Z86.0101 HISTORY OF ADENOMATOUS POLYP OF COLON: ICD-10-CM

## 2025-08-14 DIAGNOSIS — K59.09 CONSTIPATION: ICD-10-CM

## 2025-08-14 PROCEDURE — 99213 OFFICE O/P EST LOW 20 MIN: CPT | Performed by: INTERNAL MEDICINE

## 2025-08-14 RX ORDER — LINACLOTIDE 290 UG/1
1 CAPSULE AT LEAST 30 MINUTES BEFORE THE FIRST MEAL OF THE DAY ON AN EMPTY STOMACH CAPSULE, GELATIN COATED ORAL ONCE A DAY
Status: ACTIVE | COMMUNITY

## 2025-08-14 RX ORDER — PRAVASTATIN SODIUM 20 MG/1
1 TABLET TABLET ORAL ONCE A DAY
Status: ACTIVE | COMMUNITY

## 2025-08-14 RX ORDER — LISINOPRIL AND HYDROCHLOROTHIAZIDE 10; 12.5 MG/1; MG/1
1 TABLET TABLET ORAL ONCE A DAY
Status: ACTIVE | COMMUNITY

## 2025-08-14 RX ORDER — LEVOTHYROXINE SODIUM 125 UG/1
1 TABLET IN THE MORNING ON AN EMPTY STOMACH TABLET ORAL ONCE A DAY
Status: ACTIVE | COMMUNITY

## 2025-08-14 RX ORDER — METOPROLOL TARTRATE 50 MG/1
1 TABLET WITH FOOD TABLET, FILM COATED ORAL TWICE A DAY
Status: ON HOLD | COMMUNITY

## 2025-08-14 RX ORDER — LINACLOTIDE 145 UG/1
1 CAPSULE AT LEAST 30 MINUTES BEFORE THE FIRST MEAL OF THE DAY ON AN EMPTY STOMACH CAPSULE, GELATIN COATED ORAL ONCE A DAY
Qty: 90 | Refills: 3 | OUTPATIENT
Start: 2025-08-10 | End: 2026-08-05

## 2025-08-14 RX ORDER — METRONIDAZOLE 500 MG/1
1 TABLET TABLET ORAL THREE TIMES A DAY
Status: ON HOLD | COMMUNITY

## 2025-08-14 RX ORDER — LINACLOTIDE 145 UG/1
1 CAPSULE AT LEAST 30 MINUTES BEFORE THE FIRST MEAL OF THE DAY ON AN EMPTY STOMACH CAPSULE, GELATIN COATED ORAL ONCE A DAY
Qty: 90 | Refills: 3 | Status: ON HOLD | COMMUNITY